# Patient Record
Sex: MALE | Race: WHITE | URBAN - METROPOLITAN AREA
[De-identification: names, ages, dates, MRNs, and addresses within clinical notes are randomized per-mention and may not be internally consistent; named-entity substitution may affect disease eponyms.]

---

## 2021-09-01 ENCOUNTER — OFFICE VISIT (OUTPATIENT)
Dept: FAMILY MEDICINE CLINIC | Age: 71
End: 2021-09-01
Payer: COMMERCIAL

## 2021-09-01 VITALS
RESPIRATION RATE: 20 BRPM | WEIGHT: 221 LBS | DIASTOLIC BLOOD PRESSURE: 82 MMHG | SYSTOLIC BLOOD PRESSURE: 146 MMHG | OXYGEN SATURATION: 96 % | HEART RATE: 73 BPM | TEMPERATURE: 97.3 F | HEIGHT: 75 IN | BODY MASS INDEX: 27.48 KG/M2

## 2021-09-01 DIAGNOSIS — I10 ESSENTIAL HYPERTENSION: Primary | ICD-10-CM

## 2021-09-01 PROCEDURE — 99203 OFFICE O/P NEW LOW 30 MIN: CPT | Performed by: FAMILY MEDICINE

## 2021-09-01 RX ORDER — LOSARTAN POTASSIUM 100 MG/1
TABLET ORAL
COMMUNITY
Start: 2021-07-21

## 2021-09-01 RX ORDER — PRAVASTATIN SODIUM 40 MG
TABLET ORAL
COMMUNITY
Start: 2021-07-21

## 2021-09-01 RX ORDER — ASPIRIN 81 MG/1
81 TABLET ORAL DAILY
COMMUNITY

## 2021-09-01 ASSESSMENT — ENCOUNTER SYMPTOMS
ABDOMINAL PAIN: 0
CHEST TIGHTNESS: 0
SHORTNESS OF BREATH: 0
BLOOD IN STOOL: 0

## 2021-09-01 NOTE — PROGRESS NOTES
21  Denver Kidwell : 1950 Sex: male  Age: 80 y.o. Chief Complaint   Patient presents with    Blood Pressure Check     ranging at home from 185/103 - 196/109 - 204/123   / Patient states that he feels fine     Headache     Slight HA 'top of head at hs\" x 1 week       HPI this pleasant 71-year-old man with directed here by his physician after he reported several significantly elevated blood pressure readings on his home blood pressure cuff. His initial reading here taken manually by the nurse was 146/82 I did repeat the manual blood pressure and it was 146/86. At this level some intervention may be required but it certainly is not emergent. He is going to follow-up with his PCP and take his blood pressure cuff with him, it is not here today to compare. I did go over some serious signs and symptoms with him such as severe headache with visual disturbance or numbness and tingling other things like chest pain etc. he does agree that if anything worsens or any of these symptoms take place he will go immediately to the emergency room. Otherwise she is to follow-up within the next 2 days with his PCP. Review of Systems   Constitutional: Negative for diaphoresis and unexpected weight change. Eyes: Negative for visual disturbance. Respiratory: Negative for chest tightness and shortness of breath. Cardiovascular: Negative for chest pain, palpitations and leg swelling. Gastrointestinal: Negative for abdominal pain and blood in stool. Genitourinary: Negative for flank pain. Musculoskeletal: Negative for neck pain and neck stiffness. Neurological: Negative for dizziness, tremors, seizures, syncope, speech difficulty, light-headedness and numbness. Physical Exam  Constitutional:       Appearance: Normal appearance. HENT:      Head: Normocephalic. Mouth/Throat:      Mouth: Mucous membranes are moist.   Eyes:      Pupils: Pupils are equal, round, and reactive to light. Cardiovascular:      Rate and Rhythm: Normal rate and regular rhythm. Pulses: Normal pulses. Heart sounds: Normal heart sounds. Pulmonary:      Effort: Pulmonary effort is normal.      Breath sounds: Normal breath sounds. Abdominal:      Palpations: Abdomen is soft. Skin:     General: Skin is warm and dry. Neurological:      General: No focal deficit present. Mental Status: He is alert and oriented to person, place, and time. Cranial Nerves: No cranial nerve deficit. Sensory: No sensory deficit. Psychiatric:         Mood and Affect: Mood normal.         Behavior: Behavior normal.         Assessment and Plan:  Denver was seen today for blood pressure check and headache. Diagnoses and all orders for this visit:    Essential hypertension          Discussions/Education provided to patients during visit:  [] Discussed the importance to stop smoking. [] Advised to monitor eating habits. [] Reviewed and discussed Imaging results. [] Reviewed and discussed Lab results. [x] Discussed the importance of drinking plenty of fluids. [x] Cut down on Salt, Caffeine, and Sugar. [x] Continue Medications as Discussed. [x] Communicated with patient any concerns, to phone office. Return if symptoms worsen or fail to improve.       Seen By:  Izabela Hand, DO

## 2022-11-28 ENCOUNTER — TELEPHONE (OUTPATIENT)
Dept: SURGERY | Age: 72
End: 2022-11-28

## 2022-11-28 ENCOUNTER — INITIAL CONSULT (OUTPATIENT)
Dept: SURGERY | Age: 72
End: 2022-11-28
Payer: COMMERCIAL

## 2022-11-28 ENCOUNTER — HOSPITAL ENCOUNTER (EMERGENCY)
Age: 72
Discharge: HOME | End: 2022-11-28
Attending: EMERGENCY MEDICINE
Payer: COMMERCIAL

## 2022-11-28 VITALS
OXYGEN SATURATION: 96 % | DIASTOLIC BLOOD PRESSURE: 90 MMHG | HEART RATE: 83 BPM | RESPIRATION RATE: 18 BRPM | SYSTOLIC BLOOD PRESSURE: 172 MMHG | TEMPERATURE: 96.9 F

## 2022-11-28 VITALS
DIASTOLIC BLOOD PRESSURE: 100 MMHG | HEIGHT: 74 IN | HEART RATE: 84 BPM | WEIGHT: 215 LBS | BODY MASS INDEX: 27.59 KG/M2 | TEMPERATURE: 97.6 F | SYSTOLIC BLOOD PRESSURE: 230 MMHG

## 2022-11-28 DIAGNOSIS — K64.2 GRADE III HEMORRHOIDS: Primary | ICD-10-CM

## 2022-11-28 DIAGNOSIS — I10 ASYMPTOMATIC HYPERTENSION: Primary | ICD-10-CM

## 2022-11-28 DIAGNOSIS — I10 ESSENTIAL HYPERTENSION: ICD-10-CM

## 2022-11-28 PROBLEM — K62.5 RECTAL BLEEDING: Status: ACTIVE | Noted: 2022-11-28

## 2022-11-28 PROBLEM — K64.9 HEMORRHOIDS: Status: ACTIVE | Noted: 2022-11-28

## 2022-11-28 PROCEDURE — 99283 EMERGENCY DEPT VISIT LOW MDM: CPT

## 2022-11-28 PROCEDURE — 3077F SYST BP >= 140 MM HG: CPT | Performed by: SURGERY

## 2022-11-28 PROCEDURE — 99204 OFFICE O/P NEW MOD 45 MIN: CPT | Performed by: SURGERY

## 2022-11-28 PROCEDURE — 1123F ACP DISCUSS/DSCN MKR DOCD: CPT | Performed by: SURGERY

## 2022-11-28 PROCEDURE — 93005 ELECTROCARDIOGRAM TRACING: CPT

## 2022-11-28 PROCEDURE — 3079F DIAST BP 80-89 MM HG: CPT | Performed by: SURGERY

## 2022-11-28 RX ORDER — SODIUM CHLORIDE 0.9 % (FLUSH) 0.9 %
5-40 SYRINGE (ML) INJECTION PRN
OUTPATIENT
Start: 2022-11-28

## 2022-11-28 RX ORDER — SODIUM CHLORIDE 9 MG/ML
INJECTION, SOLUTION INTRAVENOUS CONTINUOUS
OUTPATIENT
Start: 2022-11-28

## 2022-11-28 RX ORDER — HYDROCORTISONE ACETATE 25 MG/1
25 SUPPOSITORY RECTAL EVERY 12 HOURS
Qty: 60 SUPPOSITORY | Refills: 0 | Status: SHIPPED | OUTPATIENT
Start: 2022-11-28 | End: 2022-12-28

## 2022-11-28 RX ORDER — SODIUM CHLORIDE 9 MG/ML
INJECTION, SOLUTION INTRAVENOUS PRN
OUTPATIENT
Start: 2022-11-28

## 2022-11-28 RX ORDER — SODIUM CHLORIDE 0.9 % (FLUSH) 0.9 %
5-40 SYRINGE (ML) INJECTION EVERY 12 HOURS SCHEDULED
OUTPATIENT
Start: 2022-11-28

## 2022-11-28 ASSESSMENT — ENCOUNTER SYMPTOMS
ABDOMINAL PAIN: 0
EYE REDNESS: 0
NAUSEA: 0
RHINORRHEA: 0
SORE THROAT: 0
COUGH: 0
CHOKING: 0
APNEA: 0
CHEST TIGHTNESS: 0
ABDOMINAL DISTENTION: 0
EYE PAIN: 0
SHORTNESS OF BREATH: 0
BLURRED VISION: 0
CONSTIPATION: 0

## 2022-11-28 NOTE — ED NOTES
INSTRUCTION TO PT PER DR Dari Brink DR RECHECKED BP PT DISCHARGED TO F/U WITH DR Vonda Hines, LPN  86/70/13 [id number]

## 2022-11-28 NOTE — TELEPHONE ENCOUNTER
Prior Authorization Form:      DEMOGRAPHICS:                     Patient Name:  Jamal Carr  Patient :  1950            Insurance:  Payor: Lennie Harris 150 / Plan: Maritza Willingham / Product Type: *No Product type* /   Insurance ID Number:    Payer/Plan Subscr  Sex Relation Sub. Ins. ID Effective Group Num   1.  [address]  Male Self [id number] 1/10/16 113                                   [address]         DIAGNOSIS & PROCEDURE:                       Procedure/Operation: Rectal exam under anesthesia and hemorrhoidectomy           CPT Code: 44474, 74763    Diagnosis:  Hemorrhoids, Rectal Bleeding    ICD10 Code: K64.9, K62.5    Location:  [address] [place] [address]    Surgeon:  Jewell Langford INFORMATION:                          Date: 2023    Time: TBD              Anesthesia:  General                                                       Status:  Outpatient        Special Comments:         Electronically signed by Luna Joya on 2022 at 10:12 AM

## 2022-11-28 NOTE — ED PROVIDER NOTES
55-year-old male presents emergency room today after having a appointment with his general surgeon. At that appointment they noted that his blood pressure was 230/100 so they sent him to the emergency room. Patient denies any symptoms that are out of the ordinary, he denies headache denies chest pain denies shortness of breath denies any abdominal issues. Patient states he has a history of hypertension however it has been well controlled with losartan 1 time in the morning. Patient also takes metformin and a statin medication. Patient reports he does not take his blood pressure at home and that his blood pressure usually is high on electronic blood pressure monitoring. The history is provided by the patient. Hypertension  Onset quality:  Gradual  Timing: Patient has had hypertension for years. Time since last dose of antihypertensive: This morning. Context: not caffeine and not drug abuse    Associated symptoms: no abdominal pain, no anxiety, no blurred vision, no chest pain, no confusion, no dizziness, no ear pain, no fever, no headaches, no nausea, no palpitations and no shortness of breath    Risk factors: diabetes    Risk factors: no alcohol use      Review of Systems   Constitutional:  Negative for activity change, appetite change, diaphoresis and fever. HENT:  Negative for congestion, drooling, ear pain, rhinorrhea, sneezing and sore throat. Eyes:  Negative for blurred vision, pain and redness. Respiratory:  Negative for apnea, cough, choking, chest tightness and shortness of breath. Cardiovascular:  Negative for chest pain and palpitations. Gastrointestinal:  Negative for abdominal distention, abdominal pain, constipation and nausea. Genitourinary:  Negative for difficulty urinating. Musculoskeletal:  Negative for gait problem, joint swelling, myalgias and neck stiffness. Skin:  Negative for pallor, rash and wound.    Neurological:  Negative for dizziness, facial asymmetry, light-headedness, numbness and headaches. Psychiatric/Behavioral:  Negative for agitation, behavioral problems and confusion. The patient is not nervous/anxious. Physical Exam  Constitutional:       Appearance: Normal appearance. He is not ill-appearing or diaphoretic. HENT:      Head: Normocephalic and atraumatic. Nose: Nose normal. No congestion or rhinorrhea. Mouth/Throat:      Mouth: Mucous membranes are moist.      Pharynx: No oropharyngeal exudate or posterior oropharyngeal erythema. Eyes:      General: No scleral icterus. Extraocular Movements: Extraocular movements intact. Pupils: Pupils are equal, round, and reactive to light. Cardiovascular:      Rate and Rhythm: Normal rate and regular rhythm. Pulses: Normal pulses. Heart sounds: No friction rub. No gallop. Pulmonary:      Effort: Pulmonary effort is normal. No respiratory distress. Breath sounds: Normal breath sounds. No stridor. No wheezing. Abdominal:      General: There is no distension. Palpations: Abdomen is soft. Tenderness: There is no abdominal tenderness. There is no guarding. Musculoskeletal:         General: No swelling, tenderness, deformity or signs of injury. Normal range of motion. Cervical back: Normal range of motion and neck supple. No tenderness. Skin:     General: Skin is warm and dry. Capillary Refill: Capillary refill takes less than 2 seconds. Findings: No lesion or rash. Neurological:      General: No focal deficit present. Mental Status: He is alert and oriented to person, place, and time. Sensory: No sensory deficit. Motor: No weakness. Psychiatric:         Mood and Affect: Mood normal.         Behavior: Behavior normal.         Thought Content: Thought content normal.       Procedures    MDM    76-year-old male was sent to the emergency department from his general surgery office with a high blood pressure reading.   Upon further work-up in the emergency room patient's blood pressure did trend downward  Findings were reassuring for no acute process  Patient was instructed if symptoms worsen please return to the emergency room or if he starts to experience symptoms  I instructed patient to follow-up with his PCP for chronic management of his hypertension. EKG Interpretation    Interpreted by emergency department physician    Rhythm: normal sinus   Rate: normal  Axis: normal  Ectopy: none  Conduction: normal  ST Segments: no acute change  T Waves: no acute change  Q Waves: none    Clinical Impression: no acute changes    Krishna Weiss DO     --------------------------------------------- PAST HISTORY ---------------------------------------------  Past Medical History:  has a past medical history of Hypertension. Past Surgical History:  has no past surgical history on file. Social History:  reports that he has never smoked. He has never used smokeless tobacco. He reports that he does not drink alcohol and does not use drugs. Family History: family history is not on file. The patients home medications have been reviewed. Allergies: Patient has no known allergies. -------------------------------------------------- RESULTS -------------------------------------------------  Labs:  No results found for this visit on 11/28/22. Radiology:  No orders to display       ------------------------- NURSING NOTES AND VITALS REVIEWED ---------------------------  Date / Time Roomed:  11/28/2022 10:59 AM  ED Bed Assignment:  [id number]    The nursing notes within the ED encounter and vital signs as below have been reviewed.    BP (!) 172/90   Pulse 83   Temp 96.9 °F (36.1 °C) (Infrared)   Resp 18   SpO2 96%   Oxygen Saturation Interpretation: Normal      ------------------------------------------ PROGRESS NOTES ------------------------------------------  I have spoken with the patient and discussed todays results, in addition to providing specific details for the plan of care and counseling regarding the diagnosis and prognosis. Their questions are answered at this time and they are agreeable with the plan. I discussed at length with them reasons for immediate return here for re evaluation. They will followup with primary care by calling their office tomorrow. --------------------------------- ADDITIONAL PROVIDER NOTES ---------------------------------  At this time the patient is without objective evidence of an acute process requiring hospitalization or inpatient management. They have remained hemodynamically stable throughout their entire ED visit and are stable for discharge with outpatient follow-up. The plan has been discussed in detail and they are aware of the specific conditions for emergent return, as well as the importance of follow-up. New Prescriptions    No medications on file       Diagnosis:  1. Asymptomatic hypertension        Disposition:  Patient's disposition: Discharge to home  Patient's condition is stable.       DO Betzy Hall DO  Resident  11/28/22 [id number]

## 2022-11-28 NOTE — TELEPHONE ENCOUNTER
Per the order of Dr. Tucker Vila, patient has been scheduled for Rectal exam under anesthesia and hemorrhoidectomy on 1.17.2023. Patient provided with procedure information during office visit and scheduled for post op follow up appointment. Patient instructed to please contact our office with any questions. Patient will need medical clearance for procedure    Procedure scheduled through [facility]. Dr. Tucker Vila to enter orders.   Electronically signed by Bethany Nath on 11/28/22 at 10:11 AM EST

## 2022-11-28 NOTE — TELEPHONE ENCOUNTER
Call placed to patients PCP to see if they could see patient today. Per patients PCP Dr. Hai Moreno, patient to report to [address] [place] [address] ED for further evaluation. Patient verbalized understanding of information discussed during office visit and agreed to report to [address] [place] [address] ED.   Electronically signed by Brandee Navarro on 11/28/22 at 10:21 AM EST

## 2022-11-28 NOTE — PROGRESS NOTES
General Surgery History and Physical  [facility]    Patient's Name/Date of Birth: Merlinda Bowler / 1950    Date: November 28, 2022     Surgeon: Janie Hess M.D.    PCP: Courtney Alcocer MD     Chief Complaint: Rectal bleeding, anal pain    HPI:   Merlinda Bowler is a 71 y.o. male who presents for evaluation of rectal bleeding and anal pain. Timing is intermittent, radiation to anus, alleviated by abstaining from bowel movements, not eating and started several months ago and severity is 8/10. Patient has had colonoscopy in last 5 years. Denies previous interventions for hemorrhoid treatment. Has tried fiber supplementation for greater than 6 weeks with little to no improvement. Has tried tucks pads, witch hazel and stool softeners with little to no improvement. No history of blood transfusion. BP taken today and high    There is no problem list on file for this patient. History reviewed. No pertinent past medical history. History reviewed. No pertinent surgical history. No Known Allergies    The patient has a family history that is negative for severe cardiovascular or respiratory issues, negative for reaction to anesthesia. Time spent reviewing past medical, surgical, social and family history, vitals, nursing assessment and images. No changes from above documented history.     Social History     Socioeconomic History    Marital status:      Spouse name: Not on file    Number of children: Not on file    Years of education: Not on file    Highest education level: Not on file   Occupational History    Not on file   Tobacco Use    Smoking status: Never    Smokeless tobacco: Never   Substance and Sexual Activity    Alcohol use: Never    Drug use: Never    Sexual activity: Not on file   Other Topics Concern    Not on file   Social History Narrative    Not on file     Social Determinants of Health     Financial Resource Strain: Not on file   Food Insecurity: Not on file Transportation Needs: Not on file   Physical Activity: Not on file   Stress: Not on file   Social Connections: Not on file   Intimate Partner Violence: Not on file   Housing Stability: Not on file           Review of Systems    A complete 10 system review was performed and are otherwise negative unless mentioned in the above HPI. Specific negatives are listed below but may not include all those reviewed.     General ROS: negative obtundation, AMS  ENT ROS: negative rhinorrhea, epistaxis  Allergy and Immunology ROS: negative itchy/watery eyes or nasal congestion  Hematological and Lymphatic ROS: negative spontaneous bleeding or bruising  Endocrine ROS: negative  lethargy, mood swings, palpitations or polydipsia/polyuria  Respiratory ROS: negative sputum changes, stridor, tachypnea or wheezing  Cardiovascular ROS: negative for - loss of consciousness, murmur or orthopnea  Gastrointestinal ROS: negative for -  hematemesis  Genito-Urinary ROS: negative for -  genital discharge or hematuria  Musculoskeletal ROS: negative for - focal weakness, gangrene  Psych/Neuro ROS: negative for - visual or auditory hallucinations, suicidal ideation    Physical exam:   BP (!) 223/109   Pulse 84   Temp 97.6 °F (36.4 °C)   Ht 6' 2\" (1.88 m)   Wt 215 lb (97.5 kg)   BMI 27.60 kg/m²   General appearance:  NAD, appears stated age  Head: NCAT, PERRLA, EOMI, red conjunctiva  Neck: supple, no masses, trachea midline  Lungs: Equal chest rise bilateral, no retractions, no wheezing  Heart: Reg rate  Abdomen: soft, nontender  Skin; warm and dry, no cyanosis  Gu: no cva tenderness  Rectal: No bleeding, small external hemorrhoids, non thrombosed, painful MARCO ANTONIO, no masses  Extremities: atraumatic, no focal motor deficits, no open wounds  Psych: No tremor, visual hallucinations        Radiology: I reviewed relevant abdominal imaging from this admission and that available in the EMR         Assessment:  Aron Schultz is a 71 y.o. male with anal pain and rectal bleeding, large grade III hemorrhoids, hypertension  There is no problem list on file for this patient. Plan:  Repeat BP was 230 and will send to PCP office or ER pending discussion with PCP  Medical clearance  OR for Exam under anesthesia hemorrhoidectomy 1/17  Discussed the risk, benefits and alternatives of surgery including wound infections, bleeding, recurrence, injury to surrounding structures, incontinence, pain and the risks of general anesthetic including MI, CVA, sudden death or reactions to anesthetic medications. The patient understands the risks and alternatives and the possibility of converting to an open procedure. All questions were answered to the patient's satisfaction and they freely signed the consent.              Anaya Clarke MD  9:37 AM  11/28/2022

## 2022-11-29 LAB
EKG ATRIAL RATE: 78 BPM
EKG P AXIS: 47 DEGREES
EKG P-R INTERVAL: 180 MS
EKG Q-T INTERVAL: 394 MS
EKG QRS DURATION: 92 MS
EKG QTC CALCULATION (BAZETT): 449 MS
EKG R AXIS: 94 DEGREES
EKG T AXIS: -5 DEGREES
EKG VENTRICULAR RATE: 78 BPM

## 2022-11-29 PROCEDURE — 93010 ELECTROCARDIOGRAM REPORT: CPT | Performed by: INTERNAL MEDICINE

## 2022-11-30 NOTE — TELEPHONE ENCOUNTER
Medical clearance received for EUA hemorrhoidectomy scheduled with Dr. Kaye Arthur on 1/17/23.   Electronically signed by Obey De La Rosa RN on 11/30/2022 at 2:12 PM

## 2023-01-09 ENCOUNTER — HOSPITAL ENCOUNTER (OUTPATIENT)
Dept: PREADMISSION TESTING | Age: 73
Discharge: HOME | End: 2023-01-09
Payer: COMMERCIAL

## 2023-01-09 VITALS
RESPIRATION RATE: 20 BRPM | DIASTOLIC BLOOD PRESSURE: 76 MMHG | HEIGHT: 74 IN | WEIGHT: 213 LBS | TEMPERATURE: 97.3 F | SYSTOLIC BLOOD PRESSURE: 144 MMHG | OXYGEN SATURATION: 99 % | HEART RATE: 67 BPM | BODY MASS INDEX: 27.34 KG/M2

## 2023-01-09 DIAGNOSIS — K62.5 RECTAL BLEEDING: Primary | ICD-10-CM

## 2023-01-09 LAB
ANION GAP SERPL CALCULATED.3IONS-SCNC: 11 MMOL/L (ref 7–16)
BUN BLDV-MCNC: 16 MG/DL (ref 6–23)
CALCIUM SERPL-MCNC: 9.5 MG/DL (ref 8.6–10.2)
CHLORIDE BLD-SCNC: 100 MMOL/L (ref 98–107)
CO2: 26 MMOL/L (ref 22–29)
CREAT SERPL-MCNC: 0.8 MG/DL (ref 0.7–1.2)
GFR SERPL CREATININE-BSD FRML MDRD: >60 ML/MIN/1.73
GLUCOSE BLD-MCNC: 163 MG/DL (ref 74–99)
HCT VFR BLD CALC: 44.7 % (ref 37–54)
HEMOGLOBIN: 15.1 G/DL (ref 12.5–16.5)
MCH RBC QN AUTO: 30.8 PG (ref 26–35)
MCHC RBC AUTO-ENTMCNC: 33.8 % (ref 32–34.5)
MCV RBC AUTO: 91.2 FL (ref 80–99.9)
PDW BLD-RTO: 11.9 FL (ref 11.5–15)
PLATELET # BLD: 198 E9/L (ref 130–450)
PMV BLD AUTO: 11.2 FL (ref 7–12)
POTASSIUM REFLEX MAGNESIUM: 3.6 MMOL/L (ref 3.5–5)
RBC # BLD: 4.9 E12/L (ref 3.8–5.8)
SODIUM BLD-SCNC: 137 MMOL/L (ref 132–146)
WBC # BLD: 7.4 E9/L (ref 4.5–11.5)

## 2023-01-09 PROCEDURE — 85027 COMPLETE CBC AUTOMATED: CPT

## 2023-01-09 PROCEDURE — 80048 BASIC METABOLIC PNL TOTAL CA: CPT

## 2023-01-09 PROCEDURE — 36415 COLL VENOUS BLD VENIPUNCTURE: CPT

## 2023-01-09 RX ORDER — AMLODIPINE BESYLATE 5 MG/1
5 TABLET ORAL DAILY
COMMUNITY

## 2023-01-09 RX ORDER — CHLORTHALIDONE 25 MG/1
25 TABLET ORAL DAILY
COMMUNITY

## 2023-01-09 RX ORDER — FOLIC ACID/MULTIVIT,IRON,MINER .4-18-35
1 TABLET,CHEWABLE ORAL DAILY
COMMUNITY

## 2023-01-09 NOTE — PROGRESS NOTES
[address]                                                                                                                    PRE OP INSTRUCTIONS FOR  [place]        Date: 1/9/2023    Date of surgery: 01/17/ 23  Arrival Time: Hospital will call you between 5pm and 7pm with your final arrival time for surgery    Do not eat or drink anything after midnight prior to surgery. This includes no water, chewing gum, mints or ice chips. Take the following medications with a small sip of water on the morning of Surgery: amlodipine     Diabetics may take evening dose of insulin but none after midnight. If you feel symptomatic or low blood sugar morning of surgery drink 1-2 ounces of apple juice only. Aspirin, Ibuprofen, Advil, Naproxen, Vitamin E and other Anti-inflammatory products should be stopped  before surgery  as directed by your physician. Take Tylenol only unless instructed otherwise by your surgeon. Do not smoke,use illicit drugs and do not drink any alcoholic beverages 24 hours prior to surgery. You may brush your teeth the morning of surgery. DO NOT SWALLOW WATER    You MUST make arrangements for a responsible adult to take you home after your surgery. You will not be allowed to leave alone or drive yourself home. It is strongly suggested someone stay with you the first 24 hrs. Your surgery will be cancelled if you do not have a ride home. Please wear simple, loose fitting clothing to the hospital.  [place] not bring valuables (money, credit cards, checkbooks, etc.) Do not wear any makeup (including no eye makeup) or nail polish on your fingers or toes. DO NOT wear any jewelry or piercings on day of surgery. All body piercing jewelry must be removed. Shower the night before surgery with _x__Antibacterial soap /PAKO WIPES________      If you have a Living Will and Durable Power of  for Healthcare, please bring in a copy.     If appropriate bring crutches, inspirex, WALKER, CANE etc... Notify your Surgeon if you develop any illness between now and surgery time, cough, cold, fever, sore throat, nausea, vomiting, etc.  Please notify your surgeon if you experience dizziness, shortness of breath or blurred vision between now & the time of your surgery. If you have ___dentures, they will be removed before going to the OR; we will provide you a container. If you wear ___contact lenses or _x__glasses, they will be removed; please bring a case for them. To provide excellent care visitors will be limited to 1 in the room at any given time. Please bring picture ID and insurance card. During flu season no children under the age of 15 are permitted in the hospital for the safety of all patients. Other On day of surgery please come to the main lobby and stop at the information desk                 Please call AMBULATORY CARE if you have any further questions.    [facility]     [address]

## 2023-01-16 ENCOUNTER — ANESTHESIA EVENT (OUTPATIENT)
Dept: OPERATING ROOM | Age: 73
End: 2023-01-16
Payer: COMMERCIAL

## 2023-01-17 ENCOUNTER — HOSPITAL ENCOUNTER (OUTPATIENT)
Age: 73
Setting detail: OUTPATIENT SURGERY
Discharge: HOME | End: 2023-01-17
Attending: SURGERY | Admitting: SURGERY
Payer: COMMERCIAL

## 2023-01-17 ENCOUNTER — ANESTHESIA (OUTPATIENT)
Dept: OPERATING ROOM | Age: 73
End: 2023-01-17
Payer: COMMERCIAL

## 2023-01-17 VITALS
HEART RATE: 79 BPM | WEIGHT: 213 LBS | TEMPERATURE: 97 F | OXYGEN SATURATION: 95 % | DIASTOLIC BLOOD PRESSURE: 72 MMHG | RESPIRATION RATE: 16 BRPM | BODY MASS INDEX: 27.34 KG/M2 | HEIGHT: 74 IN | SYSTOLIC BLOOD PRESSURE: 150 MMHG

## 2023-01-17 DIAGNOSIS — K62.5 RECTAL BLEEDING: ICD-10-CM

## 2023-01-17 DIAGNOSIS — G89.18 POSTOPERATIVE PAIN: Primary | ICD-10-CM

## 2023-01-17 DIAGNOSIS — K64.9 HEMORRHOIDS: ICD-10-CM

## 2023-01-17 LAB — METER GLUCOSE: 183 MG/DL (ref 74–99)

## 2023-01-17 PROCEDURE — 6360000002 HC RX W HCPCS

## 2023-01-17 PROCEDURE — 3600000012 HC SURGERY LEVEL 2 ADDTL 15MIN: Performed by: SURGERY

## 2023-01-17 PROCEDURE — 3600000002 HC SURGERY LEVEL 2 BASE: Performed by: SURGERY

## 2023-01-17 PROCEDURE — 7100000011 HC PHASE II RECOVERY - ADDTL 15 MIN: Performed by: SURGERY

## 2023-01-17 PROCEDURE — 3700000000 HC ANESTHESIA ATTENDED CARE: Performed by: SURGERY

## 2023-01-17 PROCEDURE — 7100000010 HC PHASE II RECOVERY - FIRST 15 MIN: Performed by: SURGERY

## 2023-01-17 PROCEDURE — 2709999900 HC NON-CHARGEABLE SUPPLY: Performed by: SURGERY

## 2023-01-17 PROCEDURE — 7100000001 HC PACU RECOVERY - ADDTL 15 MIN: Performed by: SURGERY

## 2023-01-17 PROCEDURE — 2720000010 HC SURG SUPPLY STERILE: Performed by: SURGERY

## 2023-01-17 PROCEDURE — 2500000003 HC RX 250 WO HCPCS

## 2023-01-17 PROCEDURE — 2500000003 HC RX 250 WO HCPCS: Performed by: SURGERY

## 2023-01-17 PROCEDURE — 2580000003 HC RX 258

## 2023-01-17 PROCEDURE — 7100000000 HC PACU RECOVERY - FIRST 15 MIN: Performed by: SURGERY

## 2023-01-17 PROCEDURE — 88304 TISSUE EXAM BY PATHOLOGIST: CPT

## 2023-01-17 PROCEDURE — 2580000003 HC RX 258: Performed by: SURGERY

## 2023-01-17 PROCEDURE — 82962 GLUCOSE BLOOD TEST: CPT

## 2023-01-17 PROCEDURE — 3700000001 HC ADD 15 MINUTES (ANESTHESIA): Performed by: SURGERY

## 2023-01-17 PROCEDURE — 46260 REMOVE IN/EX HEM GROUPS 2+: CPT | Performed by: SURGERY

## 2023-01-17 RX ORDER — GLYCOPYRROLATE 0.2 MG/ML
INJECTION INTRAMUSCULAR; INTRAVENOUS PRN
Status: DISCONTINUED | OUTPATIENT
Start: 2023-01-17 | End: 2023-01-17 | Stop reason: SDUPTHER

## 2023-01-17 RX ORDER — PROCHLORPERAZINE EDISYLATE 5 MG/ML
5 INJECTION INTRAMUSCULAR; INTRAVENOUS
Status: DISCONTINUED | OUTPATIENT
Start: 2023-01-17 | End: 2023-01-17 | Stop reason: HOSPADM

## 2023-01-17 RX ORDER — DIPHENHYDRAMINE HYDROCHLORIDE 50 MG/ML
12.5 INJECTION INTRAMUSCULAR; INTRAVENOUS
Status: DISCONTINUED | OUTPATIENT
Start: 2023-01-17 | End: 2023-01-17 | Stop reason: HOSPADM

## 2023-01-17 RX ORDER — HYDRALAZINE HYDROCHLORIDE 20 MG/ML
10 INJECTION INTRAMUSCULAR; INTRAVENOUS
Status: DISCONTINUED | OUTPATIENT
Start: 2023-01-17 | End: 2023-01-17 | Stop reason: HOSPADM

## 2023-01-17 RX ORDER — SODIUM CHLORIDE 9 MG/ML
INJECTION, SOLUTION INTRAVENOUS PRN
Status: DISCONTINUED | OUTPATIENT
Start: 2023-01-17 | End: 2023-01-17 | Stop reason: HOSPADM

## 2023-01-17 RX ORDER — ONDANSETRON 2 MG/ML
INJECTION INTRAMUSCULAR; INTRAVENOUS PRN
Status: DISCONTINUED | OUTPATIENT
Start: 2023-01-17 | End: 2023-01-17 | Stop reason: SDUPTHER

## 2023-01-17 RX ORDER — FENTANYL CITRATE 50 UG/ML
INJECTION, SOLUTION INTRAMUSCULAR; INTRAVENOUS PRN
Status: DISCONTINUED | OUTPATIENT
Start: 2023-01-17 | End: 2023-01-17 | Stop reason: SDUPTHER

## 2023-01-17 RX ORDER — IPRATROPIUM BROMIDE AND ALBUTEROL SULFATE 2.5; .5 MG/3ML; MG/3ML
1 SOLUTION RESPIRATORY (INHALATION)
Status: DISCONTINUED | OUTPATIENT
Start: 2023-01-17 | End: 2023-01-17 | Stop reason: HOSPADM

## 2023-01-17 RX ORDER — SODIUM CHLORIDE 0.9 % (FLUSH) 0.9 %
5-40 SYRINGE (ML) INJECTION PRN
Status: DISCONTINUED | OUTPATIENT
Start: 2023-01-17 | End: 2023-01-17 | Stop reason: HOSPADM

## 2023-01-17 RX ORDER — HYDROCORTISONE 25 MG/G
CREAM TOPICAL
Qty: 28 G | Refills: 1 | Status: SHIPPED | OUTPATIENT
Start: 2023-01-17

## 2023-01-17 RX ORDER — SODIUM CHLORIDE 9 MG/ML
INJECTION, SOLUTION INTRAVENOUS CONTINUOUS PRN
Status: DISCONTINUED | OUTPATIENT
Start: 2023-01-17 | End: 2023-01-17 | Stop reason: SDUPTHER

## 2023-01-17 RX ORDER — METHOCARBAMOL 100 MG/ML
1000 INJECTION, SOLUTION INTRAMUSCULAR; INTRAVENOUS ONCE
Status: COMPLETED | OUTPATIENT
Start: 2023-01-17 | End: 2023-01-17

## 2023-01-17 RX ORDER — KETOROLAC TROMETHAMINE 30 MG/ML
15 INJECTION, SOLUTION INTRAMUSCULAR; INTRAVENOUS
Status: DISCONTINUED | OUTPATIENT
Start: 2023-01-17 | End: 2023-01-17 | Stop reason: HOSPADM

## 2023-01-17 RX ORDER — OXYCODONE HYDROCHLORIDE AND ACETAMINOPHEN 5; 325 MG/1; MG/1
1 TABLET ORAL EVERY 6 HOURS PRN
Qty: 28 TABLET | Refills: 0 | Status: SHIPPED | OUTPATIENT
Start: 2023-01-17 | End: 2023-01-24

## 2023-01-17 RX ORDER — DOCUSATE SODIUM 100 MG/1
100 CAPSULE, LIQUID FILLED ORAL 2 TIMES DAILY
Qty: 30 CAPSULE | Refills: 0 | Status: SHIPPED | OUTPATIENT
Start: 2023-01-17 | End: 2023-02-01

## 2023-01-17 RX ORDER — METHOCARBAMOL 500 MG/1
500 TABLET, FILM COATED ORAL 4 TIMES DAILY PRN
Qty: 20 TABLET | Refills: 0 | Status: SHIPPED | OUTPATIENT
Start: 2023-01-17

## 2023-01-17 RX ORDER — ONDANSETRON 4 MG/1
4 TABLET, ORALLY DISINTEGRATING ORAL EVERY 8 HOURS PRN
Qty: 20 TABLET | Refills: 1 | Status: SHIPPED | OUTPATIENT
Start: 2023-01-17

## 2023-01-17 RX ORDER — ONDANSETRON 2 MG/ML
4 INJECTION INTRAMUSCULAR; INTRAVENOUS
Status: DISCONTINUED | OUTPATIENT
Start: 2023-01-17 | End: 2023-01-17 | Stop reason: HOSPADM

## 2023-01-17 RX ORDER — SODIUM CHLORIDE 0.9 % (FLUSH) 0.9 %
5-40 SYRINGE (ML) INJECTION EVERY 12 HOURS SCHEDULED
Status: DISCONTINUED | OUTPATIENT
Start: 2023-01-17 | End: 2023-01-17 | Stop reason: HOSPADM

## 2023-01-17 RX ORDER — LABETALOL HYDROCHLORIDE 5 MG/ML
10 INJECTION, SOLUTION INTRAVENOUS
Status: DISCONTINUED | OUTPATIENT
Start: 2023-01-17 | End: 2023-01-17 | Stop reason: HOSPADM

## 2023-01-17 RX ORDER — LIDOCAINE HYDROCHLORIDE 20 MG/ML
INJECTION, SOLUTION INTRAVENOUS PRN
Status: DISCONTINUED | OUTPATIENT
Start: 2023-01-17 | End: 2023-01-17 | Stop reason: SDUPTHER

## 2023-01-17 RX ORDER — BUPIVACAINE HYDROCHLORIDE AND EPINEPHRINE 2.5; 5 MG/ML; UG/ML
INJECTION, SOLUTION EPIDURAL; INFILTRATION; INTRACAUDAL; PERINEURAL PRN
Status: DISCONTINUED | OUTPATIENT
Start: 2023-01-17 | End: 2023-01-17 | Stop reason: ALTCHOICE

## 2023-01-17 RX ORDER — EPHEDRINE SULFATE 50 MG/ML
INJECTION INTRAVENOUS PRN
Status: DISCONTINUED | OUTPATIENT
Start: 2023-01-17 | End: 2023-01-17 | Stop reason: SDUPTHER

## 2023-01-17 RX ORDER — KETOROLAC TROMETHAMINE 15 MG/ML
INJECTION, SOLUTION INTRAMUSCULAR; INTRAVENOUS
Status: COMPLETED
Start: 2023-01-17 | End: 2023-01-17

## 2023-01-17 RX ORDER — PROPOFOL 10 MG/ML
INJECTION, EMULSION INTRAVENOUS PRN
Status: DISCONTINUED | OUTPATIENT
Start: 2023-01-17 | End: 2023-01-17 | Stop reason: SDUPTHER

## 2023-01-17 RX ORDER — NEOSTIGMINE METHYLSULFATE 1 MG/ML
INJECTION, SOLUTION INTRAVENOUS PRN
Status: DISCONTINUED | OUTPATIENT
Start: 2023-01-17 | End: 2023-01-17 | Stop reason: SDUPTHER

## 2023-01-17 RX ORDER — SODIUM CHLORIDE 9 MG/ML
INJECTION, SOLUTION INTRAVENOUS CONTINUOUS
Status: DISCONTINUED | OUTPATIENT
Start: 2023-01-17 | End: 2023-01-17 | Stop reason: HOSPADM

## 2023-01-17 RX ORDER — METHOCARBAMOL 100 MG/ML
INJECTION, SOLUTION INTRAMUSCULAR; INTRAVENOUS
Status: COMPLETED
Start: 2023-01-17 | End: 2023-01-17

## 2023-01-17 RX ORDER — MORPHINE SULFATE 2 MG/ML
2 INJECTION, SOLUTION INTRAMUSCULAR; INTRAVENOUS EVERY 5 MIN PRN
Status: DISCONTINUED | OUTPATIENT
Start: 2023-01-17 | End: 2023-01-17 | Stop reason: HOSPADM

## 2023-01-17 RX ORDER — MIDAZOLAM HYDROCHLORIDE 1 MG/ML
2 INJECTION INTRAMUSCULAR; INTRAVENOUS
Status: DISCONTINUED | OUTPATIENT
Start: 2023-01-17 | End: 2023-01-17 | Stop reason: HOSPADM

## 2023-01-17 RX ORDER — MEPERIDINE HYDROCHLORIDE 25 MG/ML
12.5 INJECTION INTRAMUSCULAR; INTRAVENOUS; SUBCUTANEOUS EVERY 5 MIN PRN
Status: DISCONTINUED | OUTPATIENT
Start: 2023-01-17 | End: 2023-01-17 | Stop reason: HOSPADM

## 2023-01-17 RX ORDER — MIDAZOLAM HYDROCHLORIDE 1 MG/ML
INJECTION INTRAMUSCULAR; INTRAVENOUS PRN
Status: DISCONTINUED | OUTPATIENT
Start: 2023-01-17 | End: 2023-01-17 | Stop reason: SDUPTHER

## 2023-01-17 RX ORDER — ROCURONIUM BROMIDE 10 MG/ML
INJECTION, SOLUTION INTRAVENOUS PRN
Status: DISCONTINUED | OUTPATIENT
Start: 2023-01-17 | End: 2023-01-17 | Stop reason: SDUPTHER

## 2023-01-17 RX ORDER — FENTANYL CITRATE 50 UG/ML
25 INJECTION, SOLUTION INTRAMUSCULAR; INTRAVENOUS EVERY 5 MIN PRN
Status: DISCONTINUED | OUTPATIENT
Start: 2023-01-17 | End: 2023-01-17 | Stop reason: HOSPADM

## 2023-01-17 RX ADMIN — GLYCOPYRROLATE 0.6 MG: 0.2 INJECTION INTRAMUSCULAR; INTRAVENOUS at 08:17

## 2023-01-17 RX ADMIN — LIDOCAINE HYDROCHLORIDE 100 MG: 20 INJECTION, SOLUTION INTRAVENOUS at 07:36

## 2023-01-17 RX ADMIN — FENTANYL CITRATE 50 MCG: 50 INJECTION, SOLUTION INTRAMUSCULAR; INTRAVENOUS at 07:36

## 2023-01-17 RX ADMIN — FENTANYL CITRATE 50 MCG: 50 INJECTION, SOLUTION INTRAMUSCULAR; INTRAVENOUS at 08:23

## 2023-01-17 RX ADMIN — MIDAZOLAM 1 MG: 1 INJECTION INTRAMUSCULAR; INTRAVENOUS at 07:22

## 2023-01-17 RX ADMIN — Medication 3 MG: at 08:17

## 2023-01-17 RX ADMIN — SODIUM CHLORIDE: 9 INJECTION, SOLUTION INTRAVENOUS at 06:38

## 2023-01-17 RX ADMIN — ONDANSETRON 4 MG: 2 INJECTION INTRAMUSCULAR; INTRAVENOUS at 08:14

## 2023-01-17 RX ADMIN — ROCURONIUM BROMIDE 25 MG: 10 INJECTION INTRAVENOUS at 07:36

## 2023-01-17 RX ADMIN — METHOCARBAMOL 1000 MG: 100 INJECTION, SOLUTION INTRAMUSCULAR; INTRAVENOUS at 08:48

## 2023-01-17 RX ADMIN — FENTANYL CITRATE 50 MCG: 50 INJECTION, SOLUTION INTRAMUSCULAR; INTRAVENOUS at 07:57

## 2023-01-17 RX ADMIN — KETOROLAC TROMETHAMINE 15 MG: 15 INJECTION, SOLUTION INTRAMUSCULAR; INTRAVENOUS at 08:46

## 2023-01-17 RX ADMIN — EPHEDRINE SULFATE 10 MG: 50 INJECTION INTRAVENOUS at 08:15

## 2023-01-17 RX ADMIN — PROPOFOL 200 MG: 10 INJECTION, EMULSION INTRAVENOUS at 07:36

## 2023-01-17 RX ADMIN — SODIUM CHLORIDE: 900 INJECTION, SOLUTION INTRAVENOUS at 06:39

## 2023-01-17 ASSESSMENT — PAIN - FUNCTIONAL ASSESSMENT: PAIN_FUNCTIONAL_ASSESSMENT: 0-10

## 2023-01-17 ASSESSMENT — LIFESTYLE VARIABLES: SMOKING_STATUS: 0

## 2023-01-17 NOTE — DISCHARGE INSTRUCTIONS
POST-OPERATIVE INSTRUCTIONS FOR Hemorrhoidectomy  [facility]    Call the office at  as soon as possible to schedule your post-operative appointment with Dr. Adriane Melara for two (2) weeks. You will have at foam tube in your rectum that is coated with pain reducing jelly. It may fall out on its own. Remove it after 24hrs if it is still in place. Pain at the site is normal and will persist for 2-4 weeks. It should improve daily. Some blood from the site is normal. This may persist for 1-2 weeks but should not cause dizziness, lightheadedness or shortness of breath. These symptoms or copious bleeding that is constant requires return to Emergency Room. Sit in a few inches of warm water (sitz bath) 3 times a day and after bowel movements. The warm water eases discomfort. Do not put soaps, salts, or shampoos in the water. General guidelines for activity:    Avoid strenuous activity or lifting anything heavier than 15 pounds for 4 weeks. It is OK to be up  walking around, and walking up and down stairs. Do what is comfortable: stop and rest when you feel tired. Drink plenty of fluids and stay on a bland diet for 2-3 days after surgery. Do NOT drive for one week and while taking your narcotic pain medicine. Watch for signs of infection:  Excessive warmth or bright redness around your incisions  Leakage of bloody or cloudy fluid from you incisions  Fever over 100.5      You will have pain medicine ordered. Take as directed. There will be steroid cream and local pain cream to be applied directly to the anus a few times a day. Apply as directed. BOWELS: constipation is a side effect of your pain meds, take a daily laxative (miralax, dulcolax, etc.) as needed to keep your bowels moving as they normally do, do not go 2-3 days without having a bowel movement. Pain medications;    Percocet- take at least 1/2 pill every 6 hours the first 36 hours after surgery, and may take as many as 2 pills every 4 hours. After the first 36hours only take the pills as needed and stop them as soon as possible. Pain meds cause constipation. Include high-fiber foods, such as fruits, vegetables, beans, and whole grains, in your diet each day. Take a fiber supplement, such as Benefiber, Citrucel, or Metamucil, every day. Read and follow all instructions on the label. Use the toilet when you feel the urge. Or when you can, schedule time each day for a bowel movement. A daily routine may help. Take your time and do not strain when having a bowel movement. But do not sit on the toilet too long. Support your feet with a small step stool when you sit on the toilet. This helps flex your hips and places your pelvis in a squatting position. Your doctor may recommend an over-the-counter laxative, such as Miralax, Milk of Magnesia, or Ex-Lax. Read and follow all instructions on the label, and do not use laxatives on a long-term basis. Do not use over-the-counter ointments or creams without talking to your doctor. Some of these may not help. Use baby wipes or medicated pads, such as Preparation H or Tucks, instead of toilet paper to clean after a bowel movement. These products do not irritate the anus. Be safe with medicines. Read and follow all instructions on the label. If the doctor gave you a prescription medicine for pain, take it as prescribed. If you are not taking a prescription pain medicine, ask your doctor if you can take an over-the-counter medicine.

## 2023-01-17 NOTE — ANESTHESIA PRE PROCEDURE
Department of Anesthesiology  Preprocedure Note       Name:  Wilner Emery   Age:  71 y.o.  :  1950                                          MRN:  [id number]         Date:  2023      Surgeon: Zoie Garnett):  Mallorie Schrader MD    Procedure: Procedure(s):  RECTAL EXAM UNDER ANESTHESIA, HEMORRHOIDECTOMY    Medications prior to admission:   Prior to Admission medications    Medication Sig Start Date End Date Taking?  Authorizing Provider   chlorthalidone (HYGROTON) 25 MG tablet Take 25 mg by mouth daily    Historical Provider, MD   amLODIPine (NORVASC) 5 MG tablet Take 5 mg by mouth daily    Historical Provider, MD   MELATONIN-MAGNESIUM CITRATE PO Take 1 tablet by mouth    Historical Provider, MD   multivitamin-iron-minerals-folic acid (CENTRUM) chewable tablet Take 1 tablet by mouth daily    Historical Provider, MD   losartan (COZAAR) 100 MG tablet TAKE ONE TABLET BY MOUTH EVERY DAY 21   Historical Provider, MD   metFORMIN (GLUCOPHAGE) 1000 MG tablet TAKE ONE TABLET BY MOUTH EVERY 12 HOURS 21   Historical Provider, MD   pravastatin (PRAVACHOL) 40 MG tablet TAKE ONE TABLET BY MOUTH EVERY DAY 21   Historical Provider, MD       Current medications:    Current Facility-Administered Medications   Medication Dose Route Frequency Provider Last Rate Last Admin    0.9 % sodium chloride infusion   IntraVENous Continuous Mallorie Schrader  mL/hr at 23 0638 New Bag at 23 0267    sodium chloride flush 0.9 % injection 5-40 mL  5-40 mL IntraVENous 2 times per day Mallorie Schrader MD        sodium chloride flush 0.9 % injection 5-40 mL  5-40 mL IntraVENous PRN Mallorie Schrader MD        0.9 % sodium chloride infusion   IntraVENous PRN Mallorie Schrader MD         Facility-Administered Medications Ordered in Other Encounters   Medication Dose Route Frequency Provider Last Rate Last Admin    0.9 % sodium chloride infusion   IntraVENous Continuous PRN Zachery Bocsh, RN   New Bag at 01/17/23 5019       Allergies:  No Known Allergies    Problem List:    Patient Active Problem List   Diagnosis Code    Hemorrhoids K64.9    Rectal bleeding K62.5       Past Medical History:        Diagnosis Date    Hyperlipidemia     Hypertension        Past Surgical History:        Procedure Laterality Date    COLONOSCOPY         Social History:    Social History     Tobacco Use    Smoking status: Never    Smokeless tobacco: Never   Substance Use Topics    Alcohol use: Never                                Counseling given: Not Answered      Vital Signs (Current):   Vitals:    01/17/23 0612   BP: (!) 176/85   Pulse: 80   Resp: 18   Temp: 36.7 °C (98.1 °F)   TempSrc: Infrared   SpO2: 95%   Weight: 213 lb (96.6 kg)   Height: 6' 2\" (1.88 m)                                              BP Readings from Last 3 Encounters:   01/17/23 (!) 176/85   01/09/23 (!) 144/76   11/28/22 (!) 172/90       NPO Status: last intake 1/16/2023 at 1700        BMI:   Wt Readings from Last 3 Encounters:   01/17/23 213 lb (96.6 kg)   01/09/23 213 lb (96.6 kg)   11/28/22 215 lb (97.5 kg)     Body mass index is 27.35 kg/m². CBC:   Lab Results   Component Value Date/Time    WBC 7.4 01/09/2023 08:30 AM    RBC 4.90 01/09/2023 08:30 AM    HGB 15.1 01/09/2023 08:30 AM    HCT 44.7 01/09/2023 08:30 AM    MCV 91.2 01/09/2023 08:30 AM    RDW 11.9 01/09/2023 08:30 AM     01/09/2023 08:30 AM       CMP:   Lab Results   Component Value Date/Time     01/09/2023 08:30 AM    K 3.6 01/09/2023 08:30 AM     01/09/2023 08:30 AM    CO2 26 01/09/2023 08:30 AM    BUN 16 01/09/2023 08:30 AM    CREATININE 0.8 01/09/2023 08:30 AM    LABGLOM >60 01/09/2023 08:30 AM    GLUCOSE 163 01/09/2023 08:30 AM    CALCIUM 9.5 01/09/2023 08:30 AM       POC Tests: No results for input(s): POCGLU, POCNA, POCK, POCCL, POCBUN, POCHEMO, POCHCT in the last 72 hours.     Coags: No results found for: PROTIME, INR, APTT    HCG (If Applicable): No results found for: PREGTESTUR, PREGSERUM, HCG, HCGQUANT     ABGs: No results found for: PHART, PO2ART, VKD7ZGJ, SVK5ZHD, BEART, Q2JRTEZM     Type & Screen (If Applicable):  No results found for: LABABO, LABRH    Drug/Infectious Status (If Applicable):  No results found for: HIV, HEPCAB    COVID-19 Screening (If Applicable): No results found for: COVID19        Anesthesia Evaluation  Patient summary reviewed and Nursing notes reviewed no history of anesthetic complications:   Airway: Mallampati: II  TM distance: >3 FB   Neck ROM: full  Mouth opening: > = 3 FB   Dental: normal exam     Comment: Denies any loose, chipped, or missing teeth    Pulmonary:Negative Pulmonary ROS and normal exam        (-) not a current smoker                           Cardiovascular:  Exercise tolerance: good (>4 METS),   (+) hypertension:, hyperlipidemia      ECG reviewed               Beta Blocker:  Not on Beta Blocker         Neuro/Psych:   Negative Neuro/Psych ROS              GI/Hepatic/Renal: Neg GI/Hepatic/Renal ROS       (-) no morbid obesity       Endo/Other:    (+) DiabetesType II DM, , .    (-) blood dyscrasia, no electrolyte abnormalities                ROS comment: On metformin. BS this AM - 183 Abdominal:             Vascular: negative vascular ROS. Other Findings:           Anesthesia Plan      general     ASA 3       Induction: intravenous. MIPS: Postoperative opioids intended and Prophylactic antiemetics administered. Anesthetic plan and risks discussed with patient. Plan discussed with CRNA and attending. Macie Rojas RN   1/17/2023    DOS STAFF ADDENDUM:    Pt seen and examined, chart reviewed (including anesthesia, drug and allergy history). Anesthetic plan, risks, benefits, alternatives, and personnel involved discussed with patient. Patient verbalized an understanding and agrees to proceed. Plan discussed with care team members and agreed upon.     Ghazala Carty MD  Staff Anesthesiologist  6:58 AM    PAT Chart Review:  Chart reviewed per routine on January 17, 2023 at 7:16 AM by ANDREW Townsend CRNA. Above represents information available via shared medical record including previous anesthesia history, drug and allergy history.

## 2023-01-17 NOTE — ANESTHESIA POSTPROCEDURE EVALUATION
Department of Anesthesiology  Postprocedure Note    Patient: Brock Gao  MRN: [id number]  [place]: 1950  Date of evaluation: 1/17/2023      Procedure Summary     Date: 01/17/23 Room / Location: [address] [place] [address] 06 / [address]    Anesthesia Start: 6434 Anesthesia Stop: 1089    Procedure: RECTAL EXAM UNDER ANESTHESIA, INTERNAL AND EXTERNAL HEMORRHOIDECTOMY (Rectum) Diagnosis:       Hemorrhoids      Rectal bleeding      (Hemorrhoids [K64.9])      (Rectal bleeding [K62.5])    Surgeons: Norma Howard MD Responsible Provider: Amanda Olmos MD    Anesthesia Type: general ASA Status: 3          Anesthesia Type: No value filed.     Gita Phase I: Gita Score: 10    Gita Phase II: Gita Score: 10      Anesthesia Post Evaluation    Patient location during evaluation: PACU  Patient participation: complete - patient participated  Level of consciousness: awake  Airway patency: patent  Nausea & Vomiting: no nausea and no vomiting  Complications: no  Cardiovascular status: hemodynamically stable  Respiratory status: acceptable  Hydration status: euvolemic

## 2023-01-17 NOTE — H&P
GENERAL SURGERY  H&P  1/17/2023      [facility]  Kathi Cruz is a 71 y.o. male who who presents today for examination under anesthesia with hemorrhoidectomy. He was last seen in the office on 11/28/2022. He denies any new complaints since last being seen in the office. He denies any previous hemorrhoid intervention. He denies taking any blood thinners. Past Medical History:   Diagnosis Date    Hyperlipidemia     Hypertension        Past Surgical History:   Procedure Laterality Date    COLONOSCOPY         Medications Prior to Admission    Prior to Admission medications    Medication Sig Start Date End Date Taking?  Authorizing Provider   chlorthalidone (HYGROTON) 25 MG tablet Take 25 mg by mouth daily    Historical Provider, MD   amLODIPine (NORVASC) 5 MG tablet Take 5 mg by mouth daily    Historical Provider, MD   MELATONIN-MAGNESIUM CITRATE PO Take 1 tablet by mouth    Historical Provider, MD   multivitamin-iron-minerals-folic acid (CENTRUM) chewable tablet Take 1 tablet by mouth daily    Historical Provider, MD   losartan (COZAAR) 100 MG tablet TAKE ONE TABLET BY MOUTH EVERY DAY 7/21/21   Historical Provider, MD   metFORMIN (GLUCOPHAGE) 1000 MG tablet TAKE ONE TABLET BY MOUTH EVERY 12 HOURS 8/12/21   Historical Provider, MD   pravastatin (PRAVACHOL) 40 MG tablet TAKE ONE TABLET BY MOUTH EVERY DAY 7/21/21   Historical Provider, MD       No Known Allergies    Family History   Problem Relation Age of Onset    Cancer Mother     Diabetes Brother        Social History     Tobacco Use    Smoking status: Never    Smokeless tobacco: Never   Substance Use Topics    Alcohol use: Never    Drug use: Never         Review of Systems:   General ROS: negative obtundation, AMS  ENT ROS: negative rhinorrhea, epistaxis  Allergy and Immunology ROS: negative itchy/watery eyes or nasal congestion  Hematological and Lymphatic ROS: negative spontaneous bleeding or bruising  Endocrine ROS: negative  lethargy, mood swings, palpitations or polydipsia/polyuria  Respiratory ROS: negative sputum changes, stridor, tachypnea or wheezing  Cardiovascular ROS: negative for - loss of consciousness, murmur or orthopnea  Gastrointestinal ROS: negative for -  hematemesis  Genito-Urinary ROS: negative for -  genital discharge or hematuria  Musculoskeletal ROS: negative for - focal weakness, gangrene  Psych/Neuro ROS: negative for - visual or auditory hallucinations, suicidal ideation      PHYSICAL EXAM:    Vitals:    01/17/23 0612   BP: (!) 176/85   Pulse: 80   Resp: 18   Temp: 98.1 °F (36.7 °C)   SpO2: 95%       GENERAL:  NAD. A&Ox3. HEAD:  Normocephalic. Atraumatic. EYES:   No scleral icterus. PERRL. LUNGS:  No increased work of breathing. CARDIOVASCULAR: RR  ABDOMEN:  Soft, non-distended, non-tender. No guarding, rigidity, rebound. EXTREMITIES:   MAEx4. Atraumatic. No LE edema. SKIN:  Warm and dry  Rectal: Exam deferred until OR      ASSESSMENT/PLAN:  71 y.o. male with large grade 3 hemorrhoids on last exam here for examination under anesthesia with hemorrhoidectomy    Discussed the risk, benefits and alternatives of surgery including wound infections, bleeding, recurrence, injury to surrounding structures, incontinence, urinary retention, pain and the risks of general anesthetic including MI, CVA, sudden death or reactions to anesthetic medications. The patient understands the risks and alternatives and the possibility of converting to an open procedure. All questions were answered to the patient's satisfaction and they freely signed the consent. Plan will be discussed with Dr. Luiza Mcbride.     Electronically signed by Lois Smith MD on 1/17/23 at 6:44 AM EST

## 2023-01-17 NOTE — OP NOTE
DATE OF PROCEDURE: 1/17/2023     PREOPERATIVE DIAGNOSIS:  Anal pain and rectal bleeding. POSTOPERATIVE DIAGNOSIS:  Grade III Internal hemorrhoids, external skin tags     PROCEDURE PERFORMED:  Exam under anesthesia with three-column hemorrhoidectomy     ANESTHESIA:  MAC and local anesthetic     SURGEON:  Amie Triplett MD.     Geoffrey Silva MD     COMPLICATIONS:  None. ESTIMATED BLOOD LOSS:  5 mL. FLUIDS:  Crystalloid. SPECIMEN:  Hemorrhoidal cushions. DESCRIPTION OF PROCEDURE:  This is a 71 y.o. male with a history of anal pain and rectal bleeding that has been recurrent. The complained of persistent pain, intermittent bleeding and failure to respond to maximal medical management. We tried over 6 weeks of fiber, topical steroids and stool softeners with no benefit. After being explained risks, benefits, and alternatives to procedure including but not limited to bleeding, repeat procedures and incontinence, they agreed to proceed. They were taken to the operating room, placed supine, administered general anesthesia, turned to prone jackknife position, and prepped and draped in a normal sterile fashion. Time-out was performed to confirm surgery site and the patient's name. We initially performed a digital rectal exam identifying large hemorrhoidal cushions both in the right anterior, right posterior and left lateral positions. Three separate areas of hemorrhoidal cushions in the sites were identified. Then we performed a bilateral pudendal block using 0.25% Marcaine with epinephrine. They were grasped with right-angle clamp and harmonic scalpel was used to perform hemorrhoidectomy. Hemostasis was achieved and we then inspected further with electrocautery. Once we were satisfied that the hemostasis was adequate, we inserted a dibucaine ladled gel plug into the anus in order for tamponade effect of hemostasis and local anesthetic application. Sterile dressing was applied.   The patient was then awoken and turned to the supine position, and transferred to the postoperative care unit in stable condition. All instrument counts, lap counts, and needle counts were correct at the completion of the procedure.      Thomas Lee MD, MS  Minimally Invasive and Bariatric Surgery  [phone] (p)  1/17/2023  8:14 AM

## 2023-01-30 ENCOUNTER — OFFICE VISIT (OUTPATIENT)
Dept: SURGERY | Age: 73
End: 2023-01-30

## 2023-01-30 VITALS
SYSTOLIC BLOOD PRESSURE: 130 MMHG | HEART RATE: 81 BPM | WEIGHT: 213 LBS | HEIGHT: 74 IN | TEMPERATURE: 98.1 F | BODY MASS INDEX: 27.34 KG/M2 | DIASTOLIC BLOOD PRESSURE: 87 MMHG

## 2023-01-30 DIAGNOSIS — K64.2 GRADE III HEMORRHOIDS: Primary | ICD-10-CM

## 2023-01-30 PROCEDURE — 99024 POSTOP FOLLOW-UP VISIT: CPT | Performed by: SURGERY

## 2023-01-30 NOTE — PROGRESS NOTES
General Surgery Office Note  Peterson Manuel MD, MS    Patient's Name/Date of Birth: Yaritza Latif / 1950    Date: January 30, 2023     Surgeon: Marissa Urbina MD    Chief Complaint: s/p hemorrhoidectomy    Patient Active Problem List   Diagnosis    Hemorrhoids    Rectal bleeding       Subjective: doing well, some pain with BM, no bleeding, moving bowels. No fevers. He is using stool softeners, lidocaine jelly, steroid cream and sitz baths    Objective:  /87   Pulse 81   Temp 98.1 °F (36.7 °C)   Ht 6' 2\" (1.88 m)   Wt 213 lb (96.6 kg)   BMI 27.35 kg/m²   Labs:  No results for input(s): WBC, HGB, HCT in the last 72 hours. Invalid input(s): PLR  Lab Results   Component Value Date    CREATININE 0.8 01/09/2023    BUN 16 01/09/2023     01/09/2023    K 3.6 01/09/2023     01/09/2023    CO2 26 01/09/2023     No results for input(s): LIPASE, AMYLASE in the last 72 hours. CBC with Differential:    Lab Results   Component Value Date/Time    WBC 7.4 01/09/2023 08:30 AM    RBC 4.90 01/09/2023 08:30 AM    HGB 15.1 01/09/2023 08:30 AM    HCT 44.7 01/09/2023 08:30 AM     01/09/2023 08:30 AM    MCV 91.2 01/09/2023 08:30 AM    MCH 30.8 01/09/2023 08:30 AM    MCHC 33.8 01/09/2023 08:30 AM    RDW 11.9 01/09/2023 08:30 AM       General appearance: AA, NAD  HEENT: NCAT, PERRLA, EOMI  Lungs: Clear, equal rise bilateral  Heart: Reg  Abdomen: soft, nondistended, nontender  Skin: No lesions, incisions well healed  Psych: No distress, conversive, no hallucinations  : No ulcers or lesions  Rectal: No bleeding, healing well    A complete 10 system review was performed and are otherwise negative unless mentioned in the above HPI. Specific negatives are listed below but may not include all those reviewed.     General ROS: negative obtundation, AMS  ENT ROS: negative rhinorrhea, epistaxis  Allergy and Immunology ROS: negative itchy/watery eyes or nasal congestion  Hematological and Lymphatic ROS: negative spontaneous bleeding or bruising  Endocrine ROS: negative  lethargy, mood swings, palpitations or polydipsia/polyuria  Respiratory ROS: negative sputum changes, stridor, tachypnea or wheezing  Cardiovascular ROS: negative for - loss of consciousness, murmur or orthopnea  Gastrointestinal ROS: negative for - hematochezia or hematemesis  Genito-Urinary ROS: negative for -  genital discharge or hematuria  Musculoskeletal ROS: negative for - focal weakness, gangrene  Psych/Neuro ROS: negative for - visual or auditory hallucinations, suicidal ideation      Time spent reviewing past medical, surgical, social and family history, vitals, nursing assessment and images.         Pathology: hemorrhoids    Assessment/Plan:  Vy Bustamante is a 71 y.o. male 2 weeks after hemorrhoidectomy, doing well    Cont SITZ baths, lidocaine and steroid creams  Motrin prn for pain  Follow up as needed  Pathology reviewed and copy provided  High fiber diet- 30g daily   Stool softeners      Physician Signature: Electronically signed by Dr. Pallavi Horton  [phone] (p)  1/30/2023  8:56 AM

## (undated) DEVICE — YANKAUER,BULB TIP,W/O VENT,RIGID,STERILE: Brand: MEDLINE

## (undated) DEVICE — COVER,LIGHT HANDLE,FLX,1/PK: Brand: MEDLINE INDUSTRIES, INC.

## (undated) DEVICE — TOWEL,OR,DSP,ST,BLUE,STD,6/PK,12PK/CS: Brand: MEDLINE

## (undated) DEVICE — SEALER LAP SM L18.8CM OPN JAW HAND/FOOT SWCH FORCETRIAD

## (undated) DEVICE — DOUBLE BASIN SET: Brand: MEDLINE INDUSTRIES, INC.

## (undated) DEVICE — SPONGE ABSORBABLE HEMORRHOIDECTOMY 8X3 CM GEL SURGFOAM

## (undated) DEVICE — BLADE ES ELASTOMERIC COAT INSUL DURABLE BEND UPTO 90DEG

## (undated) DEVICE — PENCIL ES L3M BTTN SWCH HOLSTER W/ BLDE ELECTRD EDGE

## (undated) DEVICE — GAUZE,SPONGE,4"X4",16PLY,STRL,LF,10/TRAY: Brand: MEDLINE

## (undated) DEVICE — CLOTH SURG PREP PREOPERATIVE CHLORHEXIDINE GLUC 2% READYPREP

## (undated) DEVICE — NEEDLE HYPO 25GA L1.5IN BLU POLYPR HUB S STL REG BVL STR

## (undated) DEVICE — GARMENT,MEDLINE,DVT,INT,CALF,MED, GEN2: Brand: MEDLINE

## (undated) DEVICE — TUBING, SUCTION, 1/4" X 10', STRAIGHT: Brand: MEDLINE

## (undated) DEVICE — READY WET SKIN SCRUB TRAY-LF: Brand: MEDLINE INDUSTRIES, INC.

## (undated) DEVICE — PACK,LAPAROTOMY,NO GOWNS: Brand: MEDLINE

## (undated) DEVICE — SOLUTION IV IRRIG 0.9% SODIUM CHL PLASTIC 2F7125

## (undated) DEVICE — GLOVE ORANGE PI 7 1/2   MSG9075

## (undated) DEVICE — NDL CNTR 40CT FM MAG: Brand: MEDLINE INDUSTRIES, INC.

## (undated) DEVICE — PAD,ABDOMINAL,5"X9",ST,LF,25/BX: Brand: MEDLINE INDUSTRIES, INC.

## (undated) DEVICE — ELECTRODE PT RET AD L9FT HI MOIST COND ADH HYDRGEL CORDED

## (undated) DEVICE — GOWN,SIRUS,NONRNF,SETINSLV,XL,20/CS: Brand: MEDLINE

## (undated) DEVICE — Z INACTIVE USE 2855093 SPONGE LAP X RAY DETECTABLE 12X12 IN 4 PLY COTTON WHT STRL

## (undated) DEVICE — MARKER,SKIN,WI/RULER AND LABELS: Brand: MEDLINE

## (undated) DEVICE — SYRINGE MED 10ML TRNSLUC BRL PLUNG BLK MRK POLYPR CTRL